# Patient Record
(demographics unavailable — no encounter records)

---

## 2018-03-18 NOTE — EDPHYS
Physician Documentation                                                                           

 [facility]                                                                

Name: Orly Lam                                                                              

Age: 36 yrs                                                                                       

Sex: Female                                                                                       

: 1982                                                                                   

MRN: [id number]                                                                                   

Arrival Date: 2018                                                                          

Time: 13:37                                                                                       

Account#: [id number]                                                                            

Bed 25                                                                                            

Private MD:                                                                                       

ED Physician Liu Fox                                                                      

HPI:                                                                                              

                                                                                             

15:38 This 36 yrs old  Female presents to ER via Ambulatory with complaints of       kb  

      Abdominal Pain.                                                                             

15:38 The patient presents with abdominal pain in the left upper quadrant, in the left lower  kb  

      quadrant. Onset: The symptoms/episode began/occurred 5 day(s) ago. The symptoms radiate     

      to the left flank. Associated signs and symptoms: none. The symptoms are described as       

      constant. Modifying factors: The symptoms are alleviated by nothing, the symptoms are       

      aggravated by nothing. Severity of pain: At its worst the pain was moderate in the          

      emergency department the pain is unchanged. The patient has not experienced similar         

      symptoms in the past. The patient has not recently seen a physician.                        

                                                                                                  

OB/GYN:                                                                                           

13:55 LMP 1/15/2018                                                                             

                                                                                                  

Historical:                                                                                       

- Allergies:                                                                                      

13:55 No Known Allergies;                                                                       

- Home Meds:                                                                                      

13:55 None [Active];                                                                          hj  

- PMHx:                                                                                           

13:55 None;                                                                                   hj  

- PSHx:                                                                                           

13:55 None;                                                                                   hj  

                                                                                                  

- Immunization history:: Adult Immunizations up to date.                                          

- Social history:: Patient/guardian denies using alcohol, street drugs, IV drugs,                 

  tobacco products, Smoking status: Patient/guardian denies using tobacco, never smoked.          

                                                                                                  

                                                                                                  

ROS:                                                                                              

15:36 Constitutional: Negative for fever, chills, and weight loss, Cardiovascular: Negative   kb  

      for chest pain, palpitations, and edema, Respiratory: Negative for shortness of breath,     

      cough, wheezing, and pleuritic chest pain, Back: Negative for injury and pain, :          

      Negative for injury, bleeding, discharge, and swelling, MS/Extremity: Negative for          

      injury and deformity, Skin: Negative for injury, rash, and discoloration, Neuro:            

      Negative for headache, weakness, numbness, tingling, and seizure.                           

15:36 Abdomen/GI: Positive for abdominal pain, Negative for nausea, vomiting, and diarrhea,       

      constipation, abdominal cramps, abdominal distension, anorexia.                             

                                                                                                  

Exam:                                                                                             

15:37 Constitutional:  This is a well developed, well nourished patient who is awake, alert,  kb  

      and in no acute distress. Head/Face:  Normocephalic, atraumatic. Chest/axilla:  Normal      

      chest wall appearance and motion.  Nontender with no deformity.  No lesions are             

      appreciated. Cardiovascular:  Regular rate and rhythm with a normal S1 and S2.  No          

      gallops, murmurs, or rubs.  Normal PMI, no JVD.  No pulse deficits. Respiratory:  Lungs     

      have equal breath sounds bilaterally, clear to auscultation and percussion.  No rales,      

      rhonchi or wheezes noted.  No increased work of breathing, no retractions or nasal          

      flaring. Abdomen/GI:  Soft, non-tender, with normal bowel sounds.  No distension or         

      tympany.  No guarding or rebound.  No evidence of tenderness throughout. Back:  No          

      spinal tenderness.  No costovertebral tenderness.  Full range of motion. Skin:  Warm,       

      dry with normal turgor.  Normal color with no rashes, no lesions, and no evidence of        

      cellulitis. MS/ Extremity:  Pulses equal, no cyanosis.  Neurovascular intact.  Full,        

      normal range of motion. Neuro:  Awake and alert, GCS 15, oriented to person, place,         

      time, and situation.  Cranial nerves II-XII grossly intact.  Motor strength 5/5 in all      

      extremities.  Sensory grossly intact.  Cerebellar exam normal.  Normal gait.                

                                                                                                  

Vital Signs:                                                                                      

13:55  / 99; Pulse 89; Resp 18; Temp 97.7(TE); Pulse Ox 100% on R/A; Weight 145.15 kg;  hj  

      Height 5 ft. 11 in. (180.34 cm); Pain 7/10;                                                 

13:55 Body Mass Index 44.63 (145.15 kg, 180.34 cm)                                              

                                                                                                  

MDM:                                                                                              

14:41 Patient medically screened.                                                             kb  

15:37 Data reviewed: vital signs, nurses notes. Data interpreted: Pulse oximetry: on room air kb  

      is 100 %. Interpretation: normal.                                                           

16:30 Counseling: I had a detailed discussion with the patient and/or guardian regarding: the kb  

      historical points, exam findings, and any diagnostic results supporting the                 

      discharge/admit diagnosis, lab results, the need for outpatient follow up, a family         

      practitioner, to return to the emergency department if symptoms worsen or persist or if     

      there are any questions or concerns that arise at home.                                     

16:32 Special discussion: Based on the patient's Hx, exam, and Dx evaluation, there is no     kb  

      indication for emergent surgery or inpatient Tx. It is understood by the                    

      patient/guardian that if the Sx's persist or worsen they need to return immediately for     

      re-evaluation.                                                                              

                                                                                                  

                                                                                             

15:03 Order name: Amylase, Serum                                                              kb  

                                                                                             

15:03 Order name: Basic Metabolic Panel                                                       kb  

                                                                                             

15:03 Order name: CBC with Diff                                                               kb  

                                                                                             

15:03 Order name: Hepatic Function                                                            kb  

                                                                                             

15:03 Order name: Lipase                                                                      kb  

                                                                                             

15:03 Order name: Urine Microscopic Only                                                      kb  

                                                                                             

15:44 Order name: Urine Dipstick--Ancillary (enter results)                                   ag  

                                                                                             

15:44 Order name: Urine Pregnancy--Ancillary (enter results)                                  ag  

                                                                                             

16:01 Order name: Urine Pregnancy--Ancillary; Complete Time: 16:02                            EDMS

                                                                                             

16:01 Order name: Urine Dipstick-Ancillary; Complete Time: 16:02                              EDMS

                                                                                             

16:03 Order name: CBC with Automated Diff; Complete Time: 16:03                               EDMS

                                                                                             

16:13 Order name: Basic Metabolic Panel; Complete Time: 16:30                                 EDMS

                                                                                             

16:13 Order name: Lipase; Complete Time: 16:30                                                EDMS

                                                                                             

16:19 Order name: Liver (Hepatic) Function; Complete Time: 16:30                              EDMS

                                                                                             

15:03 Order name: Urine Pregnancy Test (obtain specimen); Complete Time: 15:34                kb  

                                                                                             

15:03 Order name: IV Saline Lock; Complete Time: 15:31                                        kb  

                                                                                             

15:03 Order name: Labs collected and sent; Complete Time: 15:31                               kb  

                                                                                             

15:03 Order name: Urine Dipstick-Ancillary (obtain specimen); Complete Time: 15:34            kb  

                                                                                             

16:29 Order name: Amylase Level; Complete Time: 16:30                                         EDMS

                                                                                                  

Administered Medications:                                                                         

No medications were administered                                                                  

                                                                                                  

                                                                                                  

Disposition:                                                                                      

                                                                                             

10:48 Co-signature as Attending Physician, Liu Fox MD I agree with the assessment and  richie 

      plan of care.                                                                               

                                                                                                  

Disposition:                                                                                      

18 16:32 Discharged to Home. Impression: Generalized abdominal pain.                        

- Condition is Stable.                                                                            

- Discharge Instructions: Abdominal Pain, Women, Abdominal Pain, Adult, Easy-to-Read.             

                                                                                                  

- Medication Reconciliation Form, Thank You Letter, Antibiotic Education, Prescription            

  Opioid Use form.                                                                                

- Follow up: Private Physician; When: 2 - 3 days; Reason: Recheck today's complaints,             

  Continuance of care, Re-evaluation by your physician. Follow up: Emergency                      

  Department; When: As needed; Reason: Worsening of condition.                                    

                                                                                                  

                                                                                                  

                                                                                                  

Signatures:                                                                                       

Dispatcher [employer]                           Angie Calvin, DIANAC                 MIRANDA-Liu Chong MD MD cha Joaquin, Henry RN                      RN   hj                                                   

Nano Barrett RN                       RN   [id number]                                                  

                                                                                                  

**************************************************************************************************

## 2018-03-18 NOTE — ER
Nurse's Notes                                                                                     

 [facility]                                                                

Name: Orly Lam                                                                              

Age: 36 yrs                                                                                       

Sex: Female                                                                                       

: 1982                                                                                   

MRN: [id number]                                                                                   

Arrival Date: 2018                                                                          

Time: 13:37                                                                                       

Account#: [id number]                                                                            

Bed 25                                                                                            

Private MD:                                                                                       

Diagnosis: Generalized abdominal pain                                                             

                                                                                                  

Presentation:                                                                                     

                                                                                             

13:52 Presenting complaint: Patient states: about 5 days ago, i started having L side abd     hj  

      pain that moves to my L lower back and L hip; denies burning with urination, denies         

      nausea and vomiting, denies fever and chills;. Transition of care: patient was not          

      received from another setting of care. Onset of symptoms was 2018. Care prior     

      to arrival: None.                                                                           

13:52 Method Of Arrival: Ambulatory                                                             

13:52 Acuity: KAREN 3                                                                           hj  

                                                                                                  

Triage Assessment:                                                                                

13:55 General: Appears in no apparent distress. uncomfortable, Behavior is calm, cooperative, hj  

      appropriate for age. Pain: Complains of pain in left upper quadrant and left lower          

      quadrant Pain radiates to posterior aspect of left lateral abdomen.                         

                                                                                                  

OB/GYN:                                                                                           

13:55 LMP 1/15/2018                                                                             

                                                                                                  

Historical:                                                                                       

- Allergies:                                                                                      

13:55 No Known Allergies;                                                                     hj  

- Home Meds:                                                                                      

13:55 None [Active];                                                                          hj  

- PMHx:                                                                                           

13:55 None;                                                                                   hj  

- PSHx:                                                                                           

13:55 None;                                                                                   hj  

                                                                                                  

- Immunization history:: Adult Immunizations up to date.                                          

- Social history:: Patient/guardian denies using alcohol, street drugs, IV drugs,                 

  tobacco products, Smoking status: Patient/guardian denies using tobacco, never smoked.          

                                                                                                  

                                                                                                  

Screenin:55 Abuse screen: Denies threats or abuse. Nutritional screening: No deficits noted.        tl3 

      Tuberculosis screening: No symptoms or risk factors identified. Fall Risk None              

      identified.                                                                                 

                                                                                                  

Assessment:                                                                                       

13:55 GI: Bowel sounds present X 4 quads. Abd is soft and non tender.                         hj  

13:55 General: Appears comfortable, obese, well groomed, well developed, well nourished.      tl3 

      Pain: Pain currently is 4 out of 10 on a pain scale. Neuro: Level of Consciousness is       

      awake, alert, obeys commands, Oriented to person, place, time, situation, Appropriate       

      for age. Cardiovascular: Heart tones S1 S2 Capillary refill < 3 seconds. Respiratory:       

      Airway is patent Trachea midline Breath sounds are clear bilaterally. : No signs          

      and/or symptoms were reported regarding the genitourinary system. Reports no menstrual      

      cycle since January, states that she is very regular. EENT: No signs and/or symptoms        

      were reported regarding the EENT system. Derm: No signs and/or symptoms reported            

      regarding the dermatologic system. Musculoskeletal: No signs and/or symptoms reported       

      regarding the musculoskeletal system.                                                       

                                                                                                  

Vital Signs:                                                                                      

13:55  / 99; Pulse 89; Resp 18; Temp 97.7(TE); Pulse Ox 100% on R/A; Weight 145.15 kg;  hj  

      Height 5 ft. 11 in. (180.34 cm); Pain 7/10;                                                 

13:55 Body Mass Index 44.63 (145.15 kg, 180.34 cm)                                            hj  

                                                                                                  

ED Course:                                                                                        

13:37 Patient arrived in ED.                                                                  mr  

13:50 IV discontinued, intact, bleeding controlled, No redness/swelling at site. Pressure     tl3 

      dressing applied.                                                                           

13:54 Triage completed.                                                                       hj  

13:55 Resting quietly. Awaiting lab results.                                                  tl3 

13:55 Arm band placed on left wrist.                                                          hj  

13:55 Patient has correct armband on for positive identification. Bed in low position. Call   tl3 

      light in reach. Side rails up X 1. Adult w/ patient.                                        

13:55 No provider procedures requiring assistance completed.                                  tl3 

14:41 Angie Sharma FNP-C is PHCP.                                                        kb  

14:41 Liu Fox MD is Attending Physician.                                             kb  

14:53 Initial lab(s) drawn, by me. Inserted saline lock: 22 gauge in right forearm, using       

      aseptic technique. Blood collected.                                                         

15:01 Nano Barrett, RN is Primary Nurse.                                                     tl3 

15:34 Amylase, Serum Sent.                                                                    tl3 

15:34 Basic Metabolic Panel Sent.                                                             tl3 

15:34 CBC with Diff Sent.                                                                     tl3 

15:34 Hepatic Function Sent.                                                                  tl3 

15:34 Lipase Sent.                                                                            tl3 

15:34 Urine Microscopic Only Sent.                                                            tl3 

                                                                                             

00:23 Urine Dipstick--Ancillary (enter results) Sent.                                         tl3 

00:23 Urine Pregnancy--Ancillary (enter results) Sent.                                        tl3 

                                                                                                  

Administered Medications:                                                                         

No medications were administered                                                                  

                                                                                                  

                                                                                                  

Outcome:                                                                                          

                                                                                             

13:55 Discharged to home ambulatory.                                                          tl3 

      Condition: stable                                                                           

      Discharge instructions given to patient, family, Instructed on discharge instructions,      

      follow up and referral plans.                                                               

16:32 Discharge ordered by MD.                                                                kb  

16:54 Patient left the ED.                                                                    tl3 

                                                                                                  

Signatures:                                                                                       

Angie Sharma, SHUN JEAN-Tierra Pimentel                                mr                                                   

Celestino Kemp, RN                      RN                                                      

Nano Barertt RN                       RN   tl3                                                  

                                                                                                  

Corrections: (The following items were deleted from the chart)                                    

13:58 13:55 Pulse 89bpm; Resp 18bpm; Pulse Ox 100% RA; Temp 97.7F Temporal; 145.15 kg; Height hj  

      5 ft. 11 in.; BMI: 44.6; Pain 7/10; hj                                                      

                                                                                             

00:30 00:29 IV discontinued, intact, bleeding controlled, No redness/swelling at site.        tl3 

      Pressure dressing applied, tl3                                                              

                                                                                                  

**************************************************************************************************

## 2019-03-08 NOTE — EDPHYS
Physician Documentation                                                                           

 [facility]                                                                

Name: Orly Lam                                                                              

Age: 37 yrs                                                                                       

Sex: Female                                                                                       

: 1982                                                                                   

MRN: [id number]                                                                                   

Arrival Date: 2019                                                                          

Time: 18:53                                                                                       

Account#: [id number]                                                                            

Bed 16                                                                                            

Private MD:                                                                                       

ED Physician Andres Merino                                                                       

HPI:                                                                                              

                                                                                             

23:02 This 37 yrs old  Female presents to ER via Ambulatory with complaints of Cough.snw 

23:02 The patient or guardian reports airway noise, cough, with productive sputum, that is    snw 

      green. Onset: The symptoms/episode began/occurred suddenly, 1 week(s) ago, and became       

      persistent. Severity of symptoms: At their worst the symptoms were moderate. Associated     

      signs and symptoms: The patient has no apparent associated signs or symptoms. It is         

      unknown whether or not the patient has had similar symptoms in the past. The patient        

      has not recently seen a physician. works with the elderly.                                  

                                                                                                  

OB/GYN:                                                                                           

19:14 LMP N/A - Irregular menses                                                              jd3 

                                                                                                  

Historical:                                                                                       

- Allergies:                                                                                      

19:14 No Known Allergies;                                                                     jd3 

- Home Meds:                                                                                      

19:14 None [Active];                                                                          jd3 

- PMHx:                                                                                           

19:14 None;                                                                                   jd3 

- PSHx:                                                                                           

19:14 None;                                                                                   jd3 

                                                                                                  

- Immunization history:: Adult Immunizations up to date.                                          

- Social history:: Smoking status: Patient/guardian denies using tobacco.                         

- Ebola Screening: : Patient negative for fever greater than or equal to 101.5 degrees            

  Fahrenheit, and additional compatible Ebola Virus Disease symptoms.                             

                                                                                                  

                                                                                                  

ROS:                                                                                              

23:02 Constitutional: Negative for fever, chills, and weight loss, Eyes: Negative for injury, snw 

      pain, redness, and discharge.                                                               

23:02 Neck: Negative for injury, pain, and swelling, Cardiovascular: Negative for chest pain,     

      palpitations, and edema.                                                                    

23:02 Abdomen/GI: Negative for abdominal pain, nausea, vomiting, diarrhea, and constipation,      

      Back: Negative for injury and pain, : Negative for injury, bleeding, discharge, and       

      swelling, MS/Extremity: Negative for injury and deformity, Skin: Negative for injury,       

      rash, and discoloration, Neuro: Negative for headache, weakness, numbness, tingling,        

      and seizure.                                                                                

23:02 ENT: Positive for ear pain.                                                                 

23:02 Respiratory: Positive for cough, with green sputum, wheezing.                               

                                                                                                  

Exam:                                                                                             

22:05 Constitutional:  This is a well developed, well nourished patient who is awake, alert,  snw 

      and in no acute distress. Head/Face:  Normocephalic, atraumatic. Eyes:  Pupils equal        

      round and reactive to light, extra-ocular motions intact.  Lids and lashes normal.          

      Conjunctiva and sclera are non-icteric and not injected.  Cornea within normal limits.      

      Periorbital areas with no swelling, redness, or edema. Neck:  Trachea midline, no           

      thyromegaly or masses palpated, and no cervical lymphadenopathy.  Supple, full range of     

      motion without nuchal rigidity, or vertebral point tenderness.  No Meningismus.             

      Chest/axilla:  Normal chest wall appearance and motion.  Nontender with no deformity.       

      No lesions are appreciated. Cardiovascular:  Regular rate and rhythm with a normal S1       

      and S2.  No gallops, murmurs, or rubs.  Normal PMI, no JVD.  No pulse deficits.             

22:05 Abdomen/GI:  Soft, non-tender, with normal bowel sounds.  No distension or tympany.  No     

      guarding or rebound.  No evidence of tenderness throughout. Back:  No spinal                

      tenderness.  No costovertebral tenderness.  Full range of motion. Skin:  Warm, dry with     

      normal turgor.  Normal color with no rashes, no lesions, and no evidence of cellulitis.     

      MS/ Extremity:  Pulses equal, no cyanosis.  Neurovascular intact.  Full, normal range       

      of motion. Neuro:  Awake and alert, GCS 15, oriented to person, place, time, and            

      situation.  Cranial nerves II-XII grossly intact.  Motor strength 5/5 in all                

      extremities.  Sensory grossly intact.  Cerebellar exam normal.  Normal gait. Psych:         

      Awake, alert, with orientation to person, place and time.  Behavior, mood, and affect       

      are within normal limits.                                                                   

22:05 ENT: External ear(s): are unremarkable, Ear canal(s): are normal, TM's: decreased           

      mobility, dullness, fluid levels, on the right, Mouth: is normal, Posterior pharynx: is     

      normal, Dental exam: normal.                                                                

22:05 Respiratory: the patient does not display signs of respiratory distress,  Respirations:     

      normal, Breath sounds: bronchial sounds, decreased breath sounds, + upper airway            

      congestion. wheezing: on forced expiration.                                                 

                                                                                                  

Vital Signs:                                                                                      

19:14  / 85; Pulse 85; Resp 16 S; Temp 98.5(O); Pulse Ox 98% on R/A; Weight 141.07 kg   jd3 

      (R); Height 5 ft. 11 in. (180.34 cm) (R); Pain 7/10;                                        

21:30  / 55; Pulse 81; Resp 16; Pulse Ox 97% on R/A;                                    jb4 

22:00  / 91; Pulse 84; Resp 16; Pulse Ox 96% on R/A;                                    jb4 

19:14 Body Mass Index 43.38 (141.07 kg, 180.34 cm)                                            jd3 

                                                                                                  

MDM:                                                                                              

21:50 Patient medically screened.                                                             snw 

22:06 Data reviewed: vital signs, nurses notes. Data interpreted: Pulse oximetry: on room air snw 

      is 97 %. Interpretation: normal. Counseling: I had a detailed discussion with the           

      patient and/or guardian regarding: the historical points, exam findings, and any            

      diagnostic results supporting the discharge/admit diagnosis, the need for outpatient        

      follow up, to return to the emergency department if symptoms worsen or persist or if        

      there are any questions or concerns that arise at home. Special discussion: Based on        

      the history and exam findings, there is no indication for further emergent testing or       

      inpatient evaluation. I discussed with the patient/guardian the need to see the primary     

      care provider for further evaluation of the symptoms.                                       

                                                                                                  

Administered Medications:                                                                         

22:05 Drug: Zithromax 500 mg Route: PO;                                                       jb4 

22:48 Follow up: Response: No adverse reaction                                                jb4 

22:05 Drug: Albuterol 2.5 mg Route: Inhalation;                                               jb4 

22:48 Follow up: Response: No adverse reaction                                                jb4 

22:05 Drug: predniSONE 20 mg Route: PO;                                                       jb4 

22:48 Follow up: Response: No adverse reaction                                                [facility] 

                                                                                                  

                                                                                                  

Disposition:                                                                                      

                                                                                             

03:55 Co-signature as Attending Physician, Andres Merino MD.                                    

                                                                                                  

Disposition:                                                                                      

19 21:58 Discharged to Home. Impression: Bronchitis, not specified as acute or chronic,     

  Otitis media, unspecified, right ear.                                                           

- Condition is Stable.                                                                            

- Discharge Instructions: Acute Bronchitis, Adult, Otitis Media, Adult, How to Use an             

  Inhaler, Cool Mist Vaporizer, Cough, Adult.                                                     

- Prescriptions for Tessalon Perles 100 mg Oral Capsule - take 1 capsule by ORAL route            

  every 8 hours As needed; 15 capsule. Prednisone 20 mg Oral Tablet - take 2 tablet by            

  ORAL route once daily for 5 days; 10 tablet. Albuterol Sulfate 90 mcg/actuation -               

  inhale 1-2 puff by INHALATION route every 4-6 hours; 1 Inhaler. Zithromax 500 mg Oral           

  Tablet - take 1 tablet by ORAL route once daily for 5 days; 5 tablet.                           

- Work release form, Medication Reconciliation Form, Thank You Letter, Antibiotic                 

  Education, Prescription Opioid Use form.                                                        

- Follow up: Private Physician; When: 2 - 3 days; Reason: Recheck today's complaints,             

  Continuance of care, Re-evaluation by your physician. Follow up: Emergency                      

  Department; When: As needed; Reason: Worsening of condition.                                    

                                                                                                  

                                                                                                  

                                                                                                  

Signatures:                                                                                       

Mary Lou Eduardo, FNP-C                 FNP-Csnw                                                  

Pedro Moseley RN                       RN   jb4                                                  

Andres Merino MD MD gs Davies, Jonathon RN                    RN   jd3                                                  

                                                                                                  

Corrections: (The following items were deleted from the chart)                                    

                                                                                             

22:51 21:58 2019 21:58 Discharged to Home. Impression: Bronchitis, not specified as     jb4 

      acute or chronic; Otitis media, unspecified, right ear. Condition is Stable. Forms are      

      Medication Reconciliation Form, Thank You Letter, Antibiotic Education, Prescription        

      Opioid Use. Follow up: Private Physician; When: 2 - 3 days; Reason: Recheck today's         

      complaints, Continuance of care, Re-evaluation by your physician. Follow up: Emergency      

      Department; When: As needed; Reason: Worsening of condition. snw                            

                                                                                                  

**************************************************************************************************

## 2019-03-08 NOTE — ER
Nurse's Notes                                                                                     

 [facility]                                                                

Name: Orly Lam                                                                              

Age: 37 yrs                                                                                       

Sex: Female                                                                                       

: 1982                                                                                   

MRN: [id number]                                                                                   

Arrival Date: 2019                                                                          

Time: 18:53                                                                                       

Account#: [id number]                                                                            

Bed 16                                                                                            

Private MD:                                                                                       

Diagnosis: Bronchitis, not specified as acute or chronic;Otitis media, unspecified, right ear     

                                                                                                  

Presentation:                                                                                     

                                                                                             

19:11 Presenting complaint: Patient states: "I have had a cough since last Monday. I am       jd3 

      coughing up grayish/ green stuff and it is starting to cause a headache.". Transition       

      of care: patient was not received from another setting of care. Onset of symptoms was       

      2019. Risk Assessment: Do you want to hurt yourself or someone else? Patient      

      reports no desire to harm self or others. Initial Sepsis Screen: Does the patient meet      

      any 2 criteria? No. Patient's initial sepsis screen is negative. Does the patient have      

      a suspected source of infection? No. Patient's initial sepsis screen is negative. Care      

      prior to arrival: None.                                                                     

19:11 Method Of Arrival: Ambulatory                                                           jd3 

19:11 Acuity: KAREN 4                                                                           jd3 

                                                                                                  

OB/GYN:                                                                                           

19:14 LMP N/A - Irregular menses                                                              jd3 

                                                                                                  

Historical:                                                                                       

- Allergies:                                                                                      

19:14 No Known Allergies;                                                                     jd3 

- Home Meds:                                                                                      

19:14 None [Active];                                                                          jd3 

- PMHx:                                                                                           

19:14 None;                                                                                   jd3 

- PSHx:                                                                                           

19:14 None;                                                                                   jd3 

                                                                                                  

- Immunization history:: Adult Immunizations up to date.                                          

- Social history:: Smoking status: Patient/guardian denies using tobacco.                         

- Ebola Screening: : Patient negative for fever greater than or equal to 101.5 degrees            

  Fahrenheit, and additional compatible Ebola Virus Disease symptoms.                             

                                                                                                  

                                                                                                  

Screenin:00 Abuse screen: Denies threats or abuse. Nutritional screening: No deficits noted.        jb4 

      Tuberculosis screening: No symptoms or risk factors identified. Fall Risk None              

      identified.                                                                                 

                                                                                                  

Assessment:                                                                                       

21:00 General: Appears comfortable, obese, well groomed, Behavior is calm, cooperative,       jb4 

      appropriate for age. Pain: Complains of pain in headache Pain does not radiate. Pain        

      currently is 4 out of 10 on a pain scale. Quality of pain is described as pressure.         

      Neuro: Level of Consciousness is awake, alert, obeys commands, Oriented to person,          

      place, time, situation. Cardiovascular: Patient's skin is warm and dry. Respiratory:        

      Reports cough that is productive, pain with cough Airway is patent Respiratory effort       

      is even, unlabored, Respiratory pattern is regular, symmetrical, Breath sounds are          

      clear bilaterally. GI: No signs and/or symptoms were reported involving the                 

      gastrointestinal system. : No signs and/or symptoms were reported regarding the           

      genitourinary system. EENT: No signs and/or symptoms were reported regarding the EENT       

      system. Derm: Skin is intact, Skin is pink, warm \T\ dry. Musculoskeletal: Circulation,     

      motion, and sensation intact.                                                               

22:30 Reassessment: Patient appears in no apparent distress at this time. Patient and/or      jb4 

      family updated on plan of care and expected duration. Pain level reassessed. Patient is     

      alert, oriented x 3, equal unlabored respirations, skin warm/dry/pink.                      

                                                                                                  

Vital Signs:                                                                                      

19:14  / 85; Pulse 85; Resp 16 S; Temp 98.5(O); Pulse Ox 98% on R/A; Weight 141.07 kg   jd3 

      (R); Height 5 ft. 11 in. (180.34 cm) (R); Pain 7/10;                                        

21:30  / 55; Pulse 81; Resp 16; Pulse Ox 97% on R/A;                                    jb4 

22:00  / 91; Pulse 84; Resp 16; Pulse Ox 96% on R/A;                                    jb4 

19:14 Body Mass Index 43.38 (141.07 kg, 180.34 cm)                                            jd3 

                                                                                                  

ED Course:                                                                                        

18:53 Patient arrived in ED.                                                                  as  

19:13 Triage completed.                                                                       jd3 

19:15 Arm band placed on.                                                                     jd3 

20:42 Mary Lou Eduardo FNP-C is PHCP.                                                        snw 

20:42 Andres Merino MD is Attending Physician.                                              snw 

21:00 Patient has correct armband on for positive identification. Bed in low position. Call   jb4 

      light in reach. Side rails up X 1. Pulse ox on. NIBP on.                                    

21:12 Pedro Moseley, RN is Primary Nurse.                                                     jb4 

22:50 No provider procedures requiring assistance completed. Patient did not have IV access   jb4 

      during this emergency room visit.                                                           

                                                                                                  

Administered Medications:                                                                         

22:05 Drug: Zithromax 500 mg Route: PO;                                                       jb4 

22:48 Follow up: Response: No adverse reaction                                                jb4 

22:05 Drug: Albuterol 2.5 mg Route: Inhalation;                                               jb4 

22:48 Follow up: Response: No adverse reaction                                                jb4 

22:05 Drug: predniSONE 20 mg Route: PO;                                                       jb4 

22:48 Follow up: Response: No adverse reaction                                                jb4 

                                                                                                  

                                                                                                  

Outcome:                                                                                          

21:58 Discharge ordered by MD. matthews 

22:50 Discharged to home ambulatory.                                                          jb4 

22:50 Condition: stable                                                                           

22:50 Discharge instructions given to patient, Instructed on discharge instructions, follow       

      up and referral plans. medication usage, Demonstrated understanding of instructions,        

      follow-up care, medications, Prescriptions given X 4.                                       

22:51 Patient left the ED.                                                                    jb4 

                                                                                                  

Signatures:                                                                                       

Mary Lou Eduardo, FNP-C                 FNP-Renate Goldman James RN                       RN   jb4                                                  

Vik Camp RN                    RN   jd3                                                  

                                                                                                  

**************************************************************************************************

## 2019-03-08 NOTE — XMS REPORT
Patient Summary Document

 Created on:2019



Patient:DARLENE HOWELL

Sex:Female

:1982

External Reference #:[id number]





Demographics







 Address  [address] [place] [address] [address]



   [place] [address]

 

 Home Phone  [phone]

 

 Email Address  NONE

 

 Preferred Language  Unknown

 

 Marital Status  Unknown

 

 Zoroastrianism Affiliation  Unknown

 

 Race  Unknown

 

 Additional Race(s)  Unavailable

 

 Ethnic Group  Unknown









Author







 Organization  [facility]

 

 Address  [facility] [place] [place] [address]

 

 Phone  [phone]









Care Team Providers







 Name  Role  Phone

 

 Unavailable  Unavailable  Unavailable









Problems

This patient has no known problems.



Allergies, Adverse Reactions, Alerts

This patient has no known allergies or adverse reactions.



Medications

This patient has no known medications.